# Patient Record
Sex: MALE | Race: WHITE | NOT HISPANIC OR LATINO | Employment: FULL TIME | URBAN - METROPOLITAN AREA
[De-identification: names, ages, dates, MRNs, and addresses within clinical notes are randomized per-mention and may not be internally consistent; named-entity substitution may affect disease eponyms.]

---

## 2022-05-31 ENCOUNTER — TELEPHONE (OUTPATIENT)
Dept: ORTHOPEDIC SURGERY | Facility: CLINIC | Age: 42
End: 2022-05-31

## 2022-06-02 ENCOUNTER — OFFICE VISIT (OUTPATIENT)
Dept: ORTHOPEDIC SURGERY | Facility: CLINIC | Age: 42
End: 2022-06-02

## 2022-06-02 VITALS — HEIGHT: 72 IN | TEMPERATURE: 96.4 F | BODY MASS INDEX: 26.06 KG/M2 | WEIGHT: 192.4 LBS

## 2022-06-02 DIAGNOSIS — S82.62XA AVULSION FRACTURE OF LATERAL MALLEOLUS OF LEFT FIBULA, CLOSED, INITIAL ENCOUNTER: ICD-10-CM

## 2022-06-02 DIAGNOSIS — R52 PAIN: Primary | ICD-10-CM

## 2022-06-02 DIAGNOSIS — S86.302A INJURY OF PERONEAL TENDON OF LEFT FOOT, INITIAL ENCOUNTER: ICD-10-CM

## 2022-06-02 PROCEDURE — 99204 OFFICE O/P NEW MOD 45 MIN: CPT | Performed by: ORTHOPAEDIC SURGERY

## 2022-06-02 PROCEDURE — 73610 X-RAY EXAM OF ANKLE: CPT | Performed by: ORTHOPAEDIC SURGERY

## 2022-06-02 RX ORDER — CETIRIZINE HYDROCHLORIDE 10 MG/1
10 TABLET ORAL DAILY
COMMUNITY

## 2022-06-02 NOTE — PROGRESS NOTES
New Patient Complaint      Patient: Varghese Mcclure  YOB: 1980 41 y.o. male  Medical Record Number: [id number]    Chief Complaints: I hurt my ankle    History of Present Illness: Patient sustained injury to his left ankle on Saturday, 5/28/2022 when he was doing some [place] running in [facility] injuring his left ankle and hearing and feeling a snap.  Was seen at a [place] facility on 5/28/2021 with x-ray showing avulsion fracture.  He was sent here by his PCP for further evaluation.  Urgent care had placed into an air stirrup brace which was quite uncomfortable to him and he recently got an ASO brace off [employer] yesterday and feels good in it.  He has been sleeping well.  He has mild complaints of pain in the lateral aspect of the left ankle.    He had a previous sprain back in 1998 which resolved with some subsequent discomfort over the several years thereafter and was not having any pain or feelings of instability at the ankle preceding this most recent injury.      He is seen today at the request of Muna Pastrana MD who has requested an opinion regarding etiology and treatment of this condition    HPI    Allergies: No Known Allergies    Medications:   Current Outpatient Medications on File Prior to Visit   Medication Sig   • cetirizine (zyrTEC) 10 MG tablet Take 10 mg by mouth Daily.     No current facility-administered medications on file prior to visit.       Past Medical History:   Diagnosis Date   • Fracture, humerus      Past Surgical History:   Procedure Laterality Date   • TONSILLECTOMY       Social History     Occupational History   • Not on file   Tobacco Use   • Smoking status: Never Smoker   • Smokeless tobacco: Never Used   Vaping Use   • Vaping Use: Never used   Substance and Sexual Activity   • Alcohol use: Never   • Drug use: Never   • Sexual activity: Defer      Social History     Social History Narrative   • Not on file     Family History   Problem Relation Age of Onset   •  "No Known Problems Mother    • No Known Problems Father    • No Known Problems Sister    • No Known Problems Brother    • No Known Problems Maternal Aunt    • No Known Problems Maternal Uncle    • No Known Problems Paternal Aunt    • No Known Problems Paternal Uncle    • No Known Problems Maternal Grandmother    • No Known Problems Maternal Grandfather    • No Known Problems Paternal Grandmother    • No Known Problems Paternal Grandfather    • Anesthesia problems Neg Hx    • Broken bones Neg Hx    • Cancer Neg Hx    • Clotting disorder Neg Hx    • Collagen disease Neg Hx    • Diabetes Neg Hx    • Dislocations Neg Hx    • Osteoporosis Neg Hx    • Rheumatologic disease Neg Hx    • Scoliosis Neg Hx    • Severe sprains Neg Hx    • Hypertension Neg Hx    • Arthritis Neg Hx        Review of Systems: 14 point review of systems performed, positive pertinent findings identified in HPI. All remaining systems negative     Review of Systems      Physical Exam:   Vitals:    06/02/22 1444   Temp: 96.4 °F (35.8 °C)   Weight: 87.3 kg (192 lb 6.4 oz)   Height: 182.9 cm (72\")   PainSc:   5     Physical Exam   Constitutional: pleasant, well developed   Eyes: sclera non icteric  Hearing : adequate for exam  Cardiovascular: palpable pulses in left foot, left calf/ thigh NT without sign of DVT  Respiratoy: breathing unlabored   Neurological: grossly sensate to LT throughout left LE  Psychiatric: oriented with normal mood and affect.   Lymphatic: No palpable popliteal lymphadenopathy left LE  Skin: intact throughout left leg/foot  Musculoskeletal: On exam he has mild discomfort left ankle the distal aspect of the fibula and some to the anterolateral ligamentous structures and minimal discomfort on the peroneal tendons but no exacerbation of pain with resisted eversion and no subluxation and no tenderness of the medial ankle.  Physical Exam  Ortho Exam    Radiology: 3 views of the left ankle ordered evaluate alignment reviewed and compared " with x-rays the patient brought in from 5/28/2022 from [place].  There remains a small avulsion fracture at the tip of the left distal fibula without significant displacement.  No widening of the syndesmosis or malalignment of the talus within the mortise.  No other clear fractures identified.    Assessment/Plan: 1.  Left distal fibular avulsion fracture none 2.  Left ankle anterolateral ligamentous sprain with possible peroneal tendon injury.    We reviewed treatment options and further may be some peroneal tendon injury I do not see any gross sign of subluxation and I do not feel that further imaging would change our treatment protocol at this time.    We discussed use a boot and/or crutches which he does not feel like he needs at this time and will continue least with the ASO brace including sleeping in it for the next 2 weeks and wearing for any weightbearing activity.    We will see him back in 3 weeks with x-rays and determine treatment course going forward and if is having persistent pain to the peroneal tendons at that time we will go ahead and get an MRI.

## 2022-06-23 ENCOUNTER — OFFICE VISIT (OUTPATIENT)
Dept: ORTHOPEDIC SURGERY | Facility: CLINIC | Age: 42
End: 2022-06-23

## 2022-06-23 VITALS — TEMPERATURE: 98.9 F | WEIGHT: 193 LBS | BODY MASS INDEX: 26.14 KG/M2 | HEIGHT: 72 IN

## 2022-06-23 DIAGNOSIS — S82.62XD CLOSED AVULSION FRACTURE OF LATERAL MALLEOLUS OF LEFT FIBULA WITH ROUTINE HEALING, SUBSEQUENT ENCOUNTER: Primary | ICD-10-CM

## 2022-06-23 DIAGNOSIS — S93.402D SPRAIN OF LEFT ANKLE, UNSPECIFIED LIGAMENT, SUBSEQUENT ENCOUNTER: ICD-10-CM

## 2022-06-23 PROBLEM — S82.63XD CLOSED AVULSION FRACTURE OF LATERAL MALLEOLUS WITH ROUTINE HEALING: Status: ACTIVE | Noted: 2022-06-02

## 2022-06-23 PROBLEM — S93.402A SPRAIN OF LEFT ANKLE: Status: ACTIVE | Noted: 2022-06-23

## 2022-06-23 PROCEDURE — 73610 X-RAY EXAM OF ANKLE: CPT | Performed by: ORTHOPAEDIC SURGERY

## 2022-06-23 PROCEDURE — 99213 OFFICE O/P EST LOW 20 MIN: CPT | Performed by: ORTHOPAEDIC SURGERY

## 2022-06-23 NOTE — PROGRESS NOTES
Ankle Follow Up      Patient: Varghsee Mcclure    YOB: 1980 41 y.o. male    Chief Complaints: Ankle feeling better    History of Present Illness:Patient was seen initially on 6/2/2022 after he sustained injury to his left ankle on Saturday, 5/28/2022 when he was doing some [place] running in [facility] injuring his left ankle and hearing and feeling a snap.  Was seen at a [place] facility on 5/28/2021 with x-ray showing avulsion fracture.  He was sent here by his PCP for further evaluation.  Urgent care had placed into an air stirrup brace which was quite uncomfortable to him and he recently got an ASO brace off [employer] prior and felt good in it.  He had been sleeping and has well.  He has mild complaints of pain in the lateral aspect of the left ankle.     He had had a previous sprain back in 1998 which resolved with some subsequent discomfort for several years thereafter and but was not having any pain or feelings of instability at the ankle immediately preceding this most recent injury.    He was found to have a left distal fibular avulsion fracture with anterolateral ligamentous sprain and possible peroneal tendon injury.    Did not see any gross signs of subluxation and did not feel any further imaging would change our treatment protocol and then time.  We discussed use of boot and/or crutches which he did not feel he needed and decided to at least have him continue with the ASO brace as he is including sleeping in the next 2 weeks and) and wearing for any weightbearing activity.    He is seen back today stating that he feels better has not used his brace for the last several weeks.  Has been swimming walking and hiking even up to 13 miles using a hightop boot.  He does not really have any pain unless he bumps it or inverts his ankle too much but overall feels like he has been able to progress well and do the above activities without problem.  Pain is rated at 0 out of 10  HPI    ROS: No ankle  "pain  Past Medical History:   Diagnosis Date   • Fracture, humerus    • Sprain of left ankle 6/23/2022       Physical Exam:   Vitals:    06/23/22 1443   Temp: 98.9 °F (37.2 °C)   Weight: 87.5 kg (193 lb)   Height: 182.9 cm (72\")   PainSc: 0-No pain     Well developed with normal mood.  On exam of unable to elicit discomfort over the tip of the left fibula and he had no pain on the peroneal tendons to palpation or with resisted eversion and was nontender over the anterolateral ligamentous structures and with stable anterior drawer.      Radiology: 3 views of the left ankle ordered to evaluate fracture reviewed and compared to previous x-rays.  There remains a small avulsion over the tip of the fibula but without change in alignment compared to previous x-rays.      Assessment/Plan: 1.  Left distal fibular avulsion fracture    2.  Left ankle anterolateral ligamentous sprain with out clear evidence of recurrent peroneal tendon injury.    Seems to be doing well and not having any appreciable symptoms.  Reviewed with him that the fracture fragment is still evident and may not completely heal but as long as it is not symptomatic would not recommend any surgical treatment or further imaging.    Recommend that he continue with his brace for at least another 3 to 4 weeks and then start gradually weaning out of it except for activities involving prolonged standing or uneven ground.    In several weeks he may start doing some light jogging and and initially recommended he is on a treadmill and progress activities as tolerated.    Offered to see him back in follow-up and decided that he would just call if he has any further problems or questions.  "